# Patient Record
Sex: MALE | Race: WHITE | NOT HISPANIC OR LATINO | URBAN - NONMETROPOLITAN AREA
[De-identification: names, ages, dates, MRNs, and addresses within clinical notes are randomized per-mention and may not be internally consistent; named-entity substitution may affect disease eponyms.]

---

## 2017-01-19 ENCOUNTER — IMPORTED ENCOUNTER (OUTPATIENT)
Dept: URBAN - NONMETROPOLITAN AREA CLINIC 1 | Facility: CLINIC | Age: 62
End: 2017-01-19

## 2017-01-19 PROBLEM — H35.3131: Noted: 2017-01-19

## 2017-01-19 PROBLEM — H52.4: Noted: 2017-01-19

## 2017-01-19 PROBLEM — H25.13: Noted: 2017-01-19

## 2017-01-19 PROBLEM — H52.03: Noted: 2017-01-19

## 2017-01-19 PROBLEM — E11.9: Noted: 2017-01-19

## 2017-01-19 PROBLEM — H52.223: Noted: 2017-01-19

## 2017-01-19 PROBLEM — H43.813: Noted: 2017-01-19

## 2017-01-19 PROCEDURE — 92014 COMPRE OPH EXAM EST PT 1/>: CPT

## 2017-01-19 PROCEDURE — 92015 DETERMINE REFRACTIVE STATE: CPT

## 2017-01-19 NOTE — PATIENT DISCUSSION
Hyperopia/astig/presby-  Discussed refractive status in detail w/ pt today-  New GLRx given-  Monitor yearly or PRN Type II DM:-  Educated patient on condition today. -  Well controlled on oral mediation. -  No diabetic retinopathy noted at this time. -  Appears stable from previous exam. -  Stressed importance of good blood sugar control/diet. -  Explained that uncontrolled DM can also speed up the growth of the cataracts pt expressed understanding. -  Continue to monitor every 6 months. Cataracts OU:-  Educated patient on condition today. -  Signs/symptoms of progression discussed as well. -  Mild progression noted today but no VA or glare complaint at this time. -  Continue to monitor. ARMD mild:-  Educated patient on findings today. -  Mottling is stable OU. -  Recommended eye vitamins daily. -  Recommended AG use daily call or come in changes noted. -  Continue to monitor every 6 months. PVD OU:-  Discussed findings of exam in detail with the patient. -  The risk of retinal detachment in patients with PVDs was discussed with the patient and the warning signs of retinal detachment were carefully reviewed with the patient. -  The patient was warned to return to the office or contact the ophthalmologist on call immediately if they experience signs of retinal detachment or changes in vision noted from today. -  Continue to monitor. USMAN OU:-  Educated patient on findings today. -  Patient is asymptomatic at this time. -  Recommended ATs PRN.  -  Continue to monitor.; 's Notes: PCP - Luba Reddy [facility] HAMOT  1/19/17DFE  1/19/17OPtos 12/31/14OCT  7/11/16PHP  7/11/16

## 2018-01-22 ENCOUNTER — IMPORTED ENCOUNTER (OUTPATIENT)
Dept: URBAN - NONMETROPOLITAN AREA CLINIC 1 | Facility: CLINIC | Age: 63
End: 2018-01-22

## 2018-01-22 PROCEDURE — 92014 COMPRE OPH EXAM EST PT 1/>: CPT

## 2018-01-22 PROCEDURE — 92015 DETERMINE REFRACTIVE STATE: CPT

## 2018-01-22 NOTE — PATIENT DISCUSSION
Hyperopia/astig/presby-  Discussed refractive status in detail w/ pt today-  New GLRx given-  Monitor yearly or PRN Type II DM:-  Educated patient on condition today. -  Well controlled on oral mediation. -  No diabetic retinopathy noted at this time. -  Appears stable from previous exam. -  Stressed importance of good blood sugar control/diet. -  Explained that uncontrolled DM can also speed up the growth of the cataracts pt expressed understanding. -  Continue to monitor every 6 months. Cataracts OU:-  Educated patient on condition today. -  Signs/symptoms of progression discussed as well. -  Mild progression noted today but no VA or glare complaint at this time. -  Continue to monitor. ARMD mild:-  Educated patient on findings today. -  Mottling is stable OU. -  Recommended eye vitamins daily. -  Recommended AG use daily call or come in changes noted. -  Continue to monitor every 6 months. PVD OU:-  Discussed findings of exam in detail with the patient. -  The risk of retinal detachment in patients with PVDs was discussed with the patient and the warning signs of retinal detachment were carefully reviewed with the patient. -  The patient was warned to return to the office or contact the ophthalmologist on call immediately if they experience signs of retinal detachment or changes in vision noted from today. -  Continue to monitor. USMAN OU:-  Educated patient on findings today. -  Patient is asymptomatic at this time. -  Recommended ATs PRN.  -  Continue to monitor.; 's Notes: PCP - Nicolasa Manley [facility] HAMOT  1/22/2018DFE  1/22/2018Optos 12/31/14OCT  7/11/16PHP  7/11/16

## 2018-04-19 NOTE — PATIENT DISCUSSION
MYOPIA, OU- DISC OPT OF REFRACTIVE [id number]. PT UNDERSTANDS OPTIONS AND DESIRES TO PROCEED WITH LASIK ENH OD TO IMPROVE VA AND REDUCE DEPENDENCY ON GLS/CTLS.

## 2018-07-23 ENCOUNTER — IMPORTED ENCOUNTER (OUTPATIENT)
Dept: URBAN - NONMETROPOLITAN AREA CLINIC 1 | Facility: CLINIC | Age: 63
End: 2018-07-23

## 2018-07-23 PROBLEM — H52.03: Noted: 2018-07-23

## 2018-07-23 PROBLEM — H43.813: Noted: 2017-01-19

## 2018-07-23 PROBLEM — H35.3131: Noted: 2018-07-23

## 2018-07-23 PROBLEM — H52.4: Noted: 2018-07-23

## 2018-07-23 PROBLEM — H25.13: Noted: 2017-01-19

## 2018-07-23 PROBLEM — E11.9: Noted: 2018-07-23

## 2018-07-23 PROBLEM — H52.223: Noted: 2018-07-23

## 2018-07-23 PROCEDURE — 99214 OFFICE O/P EST MOD 30 MIN: CPT

## 2018-07-23 PROCEDURE — 92134 CPTRZ OPH DX IMG PST SGM RTA: CPT

## 2018-07-23 NOTE — PATIENT DISCUSSION
Type II DM:-  Educated patient on condition today. -  Well controlled on oral mediation. -  No diabetic retinopathy noted at this time. -  Appears stable from previous exam. -  Stressed importance of good blood sugar control/diet. -  Explained that uncontrolled DM can also speed up the growth of the cataracts pt expressed understanding. -  Continue to monitor every 6 months. Cataracts OU:-  Educated patient on condition today. -  Signs/symptoms of progression discussed as well. -  Mild progression noted today but no VA or glare complaint at this time. -  Continue to monitor. ARMD mild:-  Educated patient on findings today. -  Mottling is stable OU on exam and on OCT today. -  Recommended eye vitamins daily. -  Recommended AG use daily call or come in changes noted. -  Continue to monitor every 6 months. PVD OU:-  Discussed findings of exam in detail with the patient. -  The risk of retinal detachment in patients with PVDs was discussed with the patient and the warning signs of retinal detachment were carefully reviewed with the patient. -  The patient was warned to return to the office or contact the ophthalmologist on call immediately if they experience signs of retinal detachment or changes in vision noted from today. -  Continue to monitor. USMAN OU:-  Educated patient on findings today. -  Patient is asymptomatic at this time. -  Recommended ATs PRN. -  Continue to monitor.   Hyperopia/astig/presby-  Discussed refractive status in detail w/ pt today-  Monitor yearly or PRN; 's Notes: PCP Sisto Collet Aldon Hoff [facility] HAMOT  1/22/2018DFE  1/22/2018Optos 12/31/14OCT  07/23/2018PHP  7/11/16

## 2019-01-28 ENCOUNTER — IMPORTED ENCOUNTER (OUTPATIENT)
Dept: URBAN - NONMETROPOLITAN AREA CLINIC 1 | Facility: CLINIC | Age: 64
End: 2019-01-28

## 2019-01-28 PROBLEM — H16.223: Noted: 2019-01-28

## 2019-01-28 PROBLEM — H52.4: Noted: 2019-01-28

## 2019-01-28 PROBLEM — H25.13: Noted: 2019-01-28

## 2019-01-28 PROBLEM — E11.9: Noted: 2019-01-28

## 2019-01-28 PROBLEM — H43.813: Noted: 2019-01-28

## 2019-01-28 PROBLEM — H18.51: Noted: 2019-01-28

## 2019-01-28 PROBLEM — H35.3131: Noted: 2019-01-28

## 2019-01-28 PROCEDURE — 92015 DETERMINE REFRACTIVE STATE: CPT

## 2019-01-28 PROCEDURE — 92014 COMPRE OPH EXAM EST PT 1/>: CPT

## 2019-01-28 NOTE — PATIENT DISCUSSION
Astigmatism / Hyperopia / Aleck Daft - Discussed diagnosis in detail with patient - New glasses Rx given today- Continue to monitorNIDDM - Discussed diagnosis in detail with patient- Stressed importance of good blood sugar control- Recommend no soda’s- No diabetic retinopathy seen on todays exam- Patient reports last A1C was 7.4 and last blood sugar was 130- Letter to Dr. Shilpa Antony 	- [facility]- Discussed diagnosis in detail with patient- Discussed signs and symptoms of progression- Discussed UV protection- No treatment needed at this time - Continue to monitorARMD OU (Very Early) - Discussed diagnosis in detail with patient- Recommend  patient start eye vitamins daily such as Preservision. Sample given today- Recommend that patient start following the [address] patient to call or come into the office ASAP if any changes noted from today. Grid given today with instructions on how to use 1/28/18- Continue to monitorPVD OU:-  Discussed findings of exam in detail with the patient. -  The risk of retinal detachment in patients with PVDs was discussed with the patient and the warning signs of retinal detachment were carefully reviewed with the patient. -  The patient was warned to return to the office or contact the ophthalmologist on call immediately if they experience signs of retinal detachment or changes in vision noted from today. -  Continue to monitor.  USMAN OU / Sandro Motto OU- Discussed diagnosis in detail with patient- Discussed signs and symptoms of progression- Recommend patient drinking plenty of water and starting Omega 3’s - Recommend Refresh or Systane  throughout the day- Consider Restasis or plugs in the future if no improvement- Continue to monitorTrichalasis OD - Discussed diagnosis in detail with patient - Removed 1 lash from OD today with forceps without complications - Continue to monitor; 's Notes: PCP - Ray Alcala [facility] HAMOT  1/28/19DFE  1/28/19Optos 12/31/14OCT  07/23/2018PHP  7/11/16

## 2019-07-29 ENCOUNTER — IMPORTED ENCOUNTER (OUTPATIENT)
Dept: URBAN - NONMETROPOLITAN AREA CLINIC 1 | Facility: CLINIC | Age: 64
End: 2019-07-29

## 2019-07-29 PROBLEM — H16.223: Noted: 2019-07-29

## 2019-07-29 PROBLEM — H43.813: Noted: 2019-07-29

## 2019-07-29 PROBLEM — E11.9: Noted: 2019-07-29

## 2019-07-29 PROBLEM — H18.51: Noted: 2019-07-29

## 2019-07-29 PROBLEM — H35.3131: Noted: 2019-07-29

## 2019-07-29 PROBLEM — H25.13: Noted: 2019-07-29

## 2019-07-29 PROCEDURE — 92250 FUNDUS PHOTOGRAPHY W/I&R: CPT

## 2019-07-29 PROCEDURE — 99214 OFFICE O/P EST MOD 30 MIN: CPT

## 2019-07-29 NOTE — PATIENT DISCUSSION
NIDDM - Discussed diagnosis in detail with patient- Stressed importance of good blood sugar control- Recommend no soda’s- No diabetic retinopathy seen on todays exam- Patient reports last A1C was 6.5 and last blood sugar was 118- Letter to Dr. Dusty Maya 	- Continue to [facility] OU- Discussed diagnosis in detail with patient- Discussed signs and symptoms of progression- Discussed UV protection- No treatment needed at this time - Continue to monitorARMD OU (Very Early) - Discussed diagnosis in detail with patient- Recommend  patient continue eye vitamins daily such as Preservision. Patient states that he has not been taking- Recommend that patient continue following the [address] patient to call or come into the office ASAP if any changes noted from today. Grid given today with instructions on how to use 1/28/18- Continue to monitorPVD OU:-  Discussed findings of exam in detail with the patient. -  The risk of retinal detachment in patients with PVDs was discussed with the patient and the warning signs of retinal detachment were carefully reviewed with the patient. -  The patient was warned to return to the office or contact the ophthalmologist on call immediately if they experience signs of retinal detachment or changes in vision noted from today. -  Continue to monitor.  USMAN OU / True Hurst OU- Discussed diagnosis in detail with patient- Discussed signs and symptoms of progression- Recommend patient drinking plenty of water and starting Omega 3’s - Recommend Refresh or Systane  throughout the day- Consider Restasis or plugs in the future if no improvement- Continue to monitorTrichalasis OD - Discussed diagnosis in detail with patient - Continue to monitor; Dr's Notes: PCP - Marlee Randolph [facility] ROGERIOOT  1/28/19DFE  1/28/19Optos 7/29/19OCT  07/23/2018PHP  7/11/16

## 2020-01-31 ENCOUNTER — IMPORTED ENCOUNTER (OUTPATIENT)
Dept: URBAN - NONMETROPOLITAN AREA CLINIC 1 | Facility: CLINIC | Age: 65
End: 2020-01-31

## 2020-01-31 PROBLEM — H43.813: Noted: 2020-01-31

## 2020-01-31 PROBLEM — H52.4: Noted: 2020-01-31

## 2020-01-31 PROBLEM — E11.9: Noted: 2020-01-31

## 2020-01-31 PROBLEM — H16.223: Noted: 2020-01-31

## 2020-01-31 PROBLEM — H35.3131: Noted: 2020-01-31

## 2020-01-31 PROBLEM — H25.13: Noted: 2020-01-31

## 2020-01-31 PROBLEM — H18.51: Noted: 2020-01-31

## 2020-01-31 PROCEDURE — 92014 COMPRE OPH EXAM EST PT 1/>: CPT

## 2020-01-31 PROCEDURE — 92015 DETERMINE REFRACTIVE STATE: CPT

## 2020-08-17 ENCOUNTER — IMPORTED ENCOUNTER (OUTPATIENT)
Dept: URBAN - NONMETROPOLITAN AREA CLINIC 1 | Facility: CLINIC | Age: 65
End: 2020-08-17

## 2020-08-17 PROBLEM — H25.813: Noted: 2020-08-17

## 2020-08-17 PROBLEM — H43.813: Noted: 2020-08-17

## 2020-08-17 PROBLEM — H35.3131: Noted: 2020-08-17

## 2020-08-17 PROBLEM — H16.223: Noted: 2020-08-17

## 2020-08-17 PROBLEM — H18.51: Noted: 2020-08-17

## 2020-08-17 PROBLEM — E11.9: Noted: 2020-08-17

## 2020-08-17 PROCEDURE — 92134 CPTRZ OPH DX IMG PST SGM RTA: CPT

## 2020-08-17 PROCEDURE — 99213 OFFICE O/P EST LOW 20 MIN: CPT

## 2020-08-17 NOTE — PATIENT DISCUSSION
"Astigmatism / Hyperopia / Presbyopia OU - Discussed diagnosis in detail with patient- Continue to monitorNIDDM - Discussed diagnosis in detail with patient- Stressed importance of good blood sugar control- Recommend no soda’s- No diabetic retinopathy seen on todays exam- Patient reports last A1C was 7.4 and last blood sugar was 114- Letter to Dr. Ray Alcala 	- Continue to monitorCombined Cataracts OU- Discussed diagnosis in detail with patient- Discussed signs and symptoms of progression- Discussed UV protection- Discussed different treatment options patient defers treatment at this time - Continue to monitorARMD OU (Very Early) - Discussed diagnosis in detail with patient- Recommend  patient continue eye vitamins daily such as Preservision. Sample given 1/31/20- Recommend that patient continue following the [address] patient to call or come into the office ASAP if any changes noted from today. Grid given with instructions on how to use 1/28/18 another grid given 8/17/20- OCT done today shows Mottling OU and drusen OS- Patient states that he has not been taking vitmains or looking at the grid ""like he should"" 8/17/20- Continue to monitorPVD OU:-  Discussed findings of exam in detail with the patient. -  The risk of retinal detachment in patients with PVDs was discussed with the patient and the warning signs of retinal detachment were carefully reviewed with the patient. -  The patient was warned to return to the office or contact the ophthalmologist on call immediately if they experience signs of retinal detachment or changes in vision noted from today. -  Continue to monitor.  USMAN OU / Sandro Motto OU- Discussed diagnosis in detail with patient- Discussed signs and symptoms of progression- Recommend patient drinking plenty of water and starting Omega 3’s - Recommend Refresh or Systane Complete  throughout the day- Recommend J&J baby shampoo to scrub lids daily- Continue to monitorTrichalasis OD - Discussed diagnosis in detail with patient - Patient stable at this time - Continue to monitor; Dr's Notes: PCP - Ray Alcala [facility] HAMOT  1/28/19DFE  8/17/20Optos 7/29/19OCT  8/17/20PHP  7/11/16"

## 2021-02-15 ENCOUNTER — IMPORTED ENCOUNTER (OUTPATIENT)
Dept: URBAN - NONMETROPOLITAN AREA CLINIC 1 | Facility: CLINIC | Age: 66
End: 2021-02-15

## 2021-02-15 PROBLEM — H52.4: Noted: 2021-02-15

## 2021-02-15 PROBLEM — E11.9: Noted: 2021-02-15

## 2021-02-15 PROBLEM — H25.813: Noted: 2021-02-15

## 2021-02-15 PROBLEM — H35.3131: Noted: 2021-02-15

## 2021-02-15 PROBLEM — H16.223: Noted: 2021-02-15

## 2021-02-15 PROBLEM — H18.51: Noted: 2021-02-15

## 2021-02-15 PROBLEM — H43.813: Noted: 2021-02-15

## 2021-02-15 PROCEDURE — 92015 DETERMINE REFRACTIVE STATE: CPT

## 2021-02-15 PROCEDURE — 92014 COMPRE OPH EXAM EST PT 1/>: CPT

## 2021-02-15 NOTE — PATIENT DISCUSSION
Astigmatism / Hyperopia / Presbyopia OU - Discussed diagnosis in detail with patient- New glasses RX given today - Continue to monitorNIDDM - Discussed diagnosis in detail with patient- Stressed importance of good blood sugar control- Recommend no soda’s- No diabetic retinopathy seen on todays exam- Patient reports last A1C was 8.0 and last blood sugar was 135- Notes to Dr. Gerianne Hodgkin 	- Continue to monitorCombined Cataracts OU- Discussed diagnosis in detail with patient- Discussed signs and symptoms of progression- Discussed UV protection- Progression noted today - BAT done today OD 20/200 and OS 20/LP- Discussed different treatment options patient defers treatment at this time - Continue to monitorARMD OU (Very Early) - Discussed diagnosis in detail with patient- Recommend  patient continue eye vitamins daily such as Preservision. Sample given 1/31/20- Recommend that patient continue following the [address] patient to call or come into the office ASAP if any changes noted from today. Grid given with instructions on how to use 1/28/18 another grid given 2/15/21- OCT done today shows Mottling OU and drusen OS- Continue to monitorPVD OU:- Discussed findings of exam in detail with the patient. - The risk of retinal detachment in patients with PVDs was discussed with the patient and the warning signs of retinal detachment were carefully reviewed with the patient. - The patient was warned to return to the office or contact the ophthalmologist on call immediately if they experience signs of retinal detachment or changes in vision noted from today. - Continue to monitor.  USMAN OU / Cathlean Kugel OU- Discussed diagnosis in detail with patient- Discussed signs and symptoms of progression- Recommend patient drinking plenty of water and starting Omega 3’s - Recommend Refresh or Systane Complete  throughout the day- Recommend J&J baby shampoo to scrub lids daily- Continue to monitorTrichalasis OD - Discussed diagnosis in detail with patient - Patient stable at this time - Continue to monitor; Dr's Notes: PCP - Gerianne Hodgkin [facility] HAMOT  2/15/21DFE  2/15/21Optos 7/29/19OCT  8/17/20PHP  7/11/16

## 2021-08-16 ENCOUNTER — IMPORTED ENCOUNTER (OUTPATIENT)
Dept: URBAN - NONMETROPOLITAN AREA CLINIC 1 | Facility: CLINIC | Age: 66
End: 2021-08-16

## 2021-08-16 PROBLEM — H25.813: Noted: 2021-08-16

## 2021-08-16 PROBLEM — E11.9: Noted: 2021-08-16

## 2021-08-16 PROBLEM — H16.223: Noted: 2021-08-16

## 2021-08-16 PROBLEM — H35.3131: Noted: 2021-08-16

## 2021-08-16 PROBLEM — H18.513: Noted: 2021-08-16

## 2021-08-16 PROBLEM — H43.813: Noted: 2021-02-15

## 2021-08-16 PROBLEM — H52.4: Noted: 2021-08-16

## 2021-08-16 PROCEDURE — 92134 CPTRZ OPH DX IMG PST SGM RTA: CPT

## 2021-08-16 PROCEDURE — 99213 OFFICE O/P EST LOW 20 MIN: CPT

## 2021-08-16 NOTE — PATIENT DISCUSSION
Astigmatism / Hyperopia / Presbyopia OU - Discussed diagnosis in detail with patient- Quick MR done by tech due to vision complaint but no RX given today- Continue to monitorNIDDM - Discussed diagnosis in detail with patient- Stressed importance of good blood sugar control- Recommend no soda’s- No diabetic retinopathy seen on todays exam- Notes to Dr. Marlee Randolph 	- Continue to monitorCombined Cataracts OU- Discussed diagnosis in detail with patient- Discussed signs and symptoms of progression- Discussed UV protection- Progression noted today - BAT done previously OD 20/200 and OS 20/LP- Discussed different treatment options patient defers treatment at this time - Continue to monitorARMD OU (Very Early) - Discussed diagnosis in detail with patient- Recommend  patient continue eye vitamins daily such as Preservision. Sample given 1/31/20- Recommend that patient continue following the [address] patient to call or come into the office ASAP if any changes noted from today. Grid given with instructions on how to use 1/28/18 another grid given 2/15/21 8/16/21- OCT done today shows Mottling OU and drusen OS- Continue to monitorPVD OU:- Discussed findings of exam in detail with the patient. - The risk of retinal detachment in patients with PVDs was discussed with the patient and the warning signs of retinal detachment were carefully reviewed with the patient. - The patient was warned to return to the office or contact the ophthalmologist on call immediately if they experience signs of retinal detachment or changes in vision noted from today. - Continue to monitor.  USMAN OU / True Hurst OU- Discussed diagnosis in detail with patient- Discussed signs and symptoms of progression- Recommend patient drinking plenty of water and starting Omega 3’s - Recommend Refresh or Systane Complete  throughout the day- Recommend J&J baby shampoo to scrub lids daily- Continue to monitorTrichalasis OD - Discussed diagnosis in detail with patient - Patient stable at this time - Continue to monitor; Dr's Notes: PCP - Marlee Randolph [facility] HAMOT  2/15/21DFE  2/15/21Optos 7/29/19OCT  8/16/21PHP  7/11/16

## 2022-02-12 ASSESSMENT — TONOMETRY
OS_IOP_MMHG: 11
OD_IOP_MMHG: 12
OD_IOP_MMHG: 12
OD_IOP_MMHG: 13
OS_IOP_MMHG: 12
OS_IOP_MMHG: 13

## 2022-02-12 ASSESSMENT — VISUAL ACUITY
OD_CC: 20/20-2
OS_SC: 20/25-
OS_CC: 20/50
OD_SC: 20/20-
OS_PH: 20/20-
OS_SC: 20/20-2
OD_SC: 20/20

## 2022-02-18 ENCOUNTER — ESTABLISHED PATIENT (OUTPATIENT)
Dept: URBAN - NONMETROPOLITAN AREA CLINIC 1 | Facility: CLINIC | Age: 67
End: 2022-02-18

## 2022-02-18 DIAGNOSIS — H52.4: ICD-10-CM

## 2022-02-18 PROCEDURE — 92014 COMPRE OPH EXAM EST PT 1/>: CPT

## 2022-02-18 PROCEDURE — 92015 DETERMINE REFRACTIVE STATE: CPT

## 2022-02-18 ASSESSMENT — VISUAL ACUITY
OS_CC: 20/40-2
OS_CC: J3
OD_CC: J2
OS_PH: 20/30-1
OD_CC: 20/25-1

## 2022-02-18 ASSESSMENT — TONOMETRY
OS_IOP_MMHG: 20
OD_IOP_MMHG: 19

## 2022-02-18 NOTE — PATIENT DISCUSSION
Discussed diagnosis in detail with patient. Signs/symptoms associated with progression discussed. Recommend  patient continue eye vitamins daily such as PreserVision. Recommend that patient follow the [address], patient to call or come into the office ASAP if any changes noted from today. Continue to monitor.

## 2022-04-09 ASSESSMENT — TONOMETRY
OS_IOP_MMHG: 14
OD_IOP_MMHG: 14
OS_IOP_MMHG: 13
OD_IOP_MMHG: 16
OD_IOP_MMHG: 14
OD_IOP_MMHG: 12
OS_IOP_MMHG: 14
OS_IOP_MMHG: 16
OS_IOP_MMHG: 12
OD_IOP_MMHG: 16
OD_IOP_MMHG: 14
OS_IOP_MMHG: 16

## 2022-04-09 ASSESSMENT — VISUAL ACUITY
OS_SC: 20/30-1
OD_SC: 20/20-1
OU_SC: 20/20
OS_SC: 20/25+1
OD_SC: 20/25-
OD_SC: 20/20-
OS_SC: 20/30-
OD_GLARE: 20/50
OS_SC: 20/20-
OS_GLARE: 20/50
OS_SC: 20/29-1
OS_SC: 20/22+
OD_SC: 20/25-1
OD_GLARE: 20/200
OD_SC: 20/22+

## 2022-08-19 ENCOUNTER — ESTABLISHED PATIENT (OUTPATIENT)
Dept: URBAN - NONMETROPOLITAN AREA CLINIC 1 | Facility: CLINIC | Age: 67
End: 2022-08-19

## 2022-08-19 DIAGNOSIS — H52.4: ICD-10-CM

## 2022-08-19 PROCEDURE — 92015 DETERMINE REFRACTIVE STATE: CPT

## 2022-08-19 PROCEDURE — 92014 COMPRE OPH EXAM EST PT 1/>: CPT

## 2022-08-19 ASSESSMENT — VISUAL ACUITY: OD_CC: 20/20

## 2022-08-19 ASSESSMENT — TONOMETRY
OD_IOP_MMHG: 17
OS_IOP_MMHG: 18

## 2022-08-19 NOTE — PATIENT DISCUSSION
Discussed diagnosis in detail with patient. New Glasses RX given today. MR done today by Dr. Yessenia Anderson. Continue to monitor.

## 2022-08-19 NOTE — PATIENT DISCUSSION
Discussed diagnosis in detail with patient. Signs/symptoms associated with progression discussed. Recommend  patient continue eye vitamins daily such as PreserVision. Recommend that patient follow the [address], patient to call or come into the office ASAP if any changes noted from today. Continue to monitor.

## 2023-08-25 ENCOUNTER — ESTABLISHED PATIENT (OUTPATIENT)
Dept: URBAN - NONMETROPOLITAN AREA CLINIC 1 | Facility: CLINIC | Age: 68
End: 2023-08-25

## 2023-08-25 DIAGNOSIS — H52.4: ICD-10-CM

## 2023-08-25 PROCEDURE — 92014 COMPRE OPH EXAM EST PT 1/>: CPT

## 2023-08-25 PROCEDURE — 92015 DETERMINE REFRACTIVE STATE: CPT

## 2023-08-25 ASSESSMENT — VISUAL ACUITY
OS_CC: 20/30-2
OD_CC: 20/20-2
OU_CC: 20/20-2

## 2023-08-25 ASSESSMENT — TONOMETRY
OD_IOP_MMHG: 16
OS_IOP_MMHG: 16

## 2024-02-26 ENCOUNTER — FOLLOW UP (OUTPATIENT)
Dept: URBAN - NONMETROPOLITAN AREA CLINIC 1 | Facility: CLINIC | Age: 69
End: 2024-02-26

## 2024-02-26 DIAGNOSIS — H25.813: ICD-10-CM

## 2024-02-26 DIAGNOSIS — E11.9: ICD-10-CM

## 2024-02-26 DIAGNOSIS — H16.223: ICD-10-CM

## 2024-02-26 DIAGNOSIS — H35.3131: ICD-10-CM

## 2024-02-26 PROCEDURE — 99213 OFFICE O/P EST LOW 20 MIN: CPT

## 2024-02-26 PROCEDURE — 92134 CPTRZ OPH DX IMG PST SGM RTA: CPT

## 2024-02-26 ASSESSMENT — TONOMETRY
OD_IOP_MMHG: 16
OS_IOP_MMHG: 16

## 2024-02-26 ASSESSMENT — VISUAL ACUITY
OD_BAT: 20/50
OU_CC: 20/30
OS_BAT: 20/50-
OD_CC: 20/32-
OS_CC: 20/32-

## 2024-09-18 ENCOUNTER — CONSULTATION/EVALUATION (OUTPATIENT)
Dept: URBAN - NONMETROPOLITAN AREA CLINIC 1 | Facility: CLINIC | Age: 69
End: 2024-09-18

## 2024-09-18 DIAGNOSIS — H52.4: ICD-10-CM

## 2024-09-18 PROCEDURE — 92014 COMPRE OPH EXAM EST PT 1/>: CPT

## 2024-09-18 PROCEDURE — 92015 DETERMINE REFRACTIVE STATE: CPT

## 2025-03-03 ENCOUNTER — FOLLOW UP (OUTPATIENT)
Age: 70
End: 2025-03-03

## 2025-03-03 DIAGNOSIS — H35.3131: ICD-10-CM

## 2025-03-03 DIAGNOSIS — H43.813: ICD-10-CM

## 2025-03-03 DIAGNOSIS — H25.813: ICD-10-CM

## 2025-03-03 DIAGNOSIS — E11.9: ICD-10-CM

## 2025-03-03 DIAGNOSIS — H16.223: ICD-10-CM

## 2025-03-03 PROCEDURE — 92134 CPTRZ OPH DX IMG PST SGM RTA: CPT

## 2025-03-03 PROCEDURE — 99213 OFFICE O/P EST LOW 20 MIN: CPT
